# Patient Record
Sex: FEMALE | Race: WHITE | NOT HISPANIC OR LATINO | Employment: UNEMPLOYED | URBAN - METROPOLITAN AREA
[De-identification: names, ages, dates, MRNs, and addresses within clinical notes are randomized per-mention and may not be internally consistent; named-entity substitution may affect disease eponyms.]

---

## 2024-02-01 ENCOUNTER — OFFICE VISIT (OUTPATIENT)
Dept: URGENT CARE | Facility: CLINIC | Age: 49
End: 2024-02-01
Payer: COMMERCIAL

## 2024-02-01 ENCOUNTER — HOSPITAL ENCOUNTER (EMERGENCY)
Facility: HOSPITAL | Age: 49
Discharge: HOME | End: 2024-02-01
Attending: EMERGENCY MEDICINE
Payer: COMMERCIAL

## 2024-02-01 VITALS
TEMPERATURE: 97.9 F | HEART RATE: 74 BPM | HEIGHT: 61 IN | WEIGHT: 167 LBS | OXYGEN SATURATION: 97 % | DIASTOLIC BLOOD PRESSURE: 91 MMHG | BODY MASS INDEX: 31.53 KG/M2 | SYSTOLIC BLOOD PRESSURE: 142 MMHG | RESPIRATION RATE: 18 BRPM

## 2024-02-01 VITALS
OXYGEN SATURATION: 98 % | SYSTOLIC BLOOD PRESSURE: 164 MMHG | HEART RATE: 78 BPM | WEIGHT: 167.4 LBS | TEMPERATURE: 97.9 F | DIASTOLIC BLOOD PRESSURE: 100 MMHG | RESPIRATION RATE: 18 BRPM

## 2024-02-01 DIAGNOSIS — I10 HYPERTENSION, UNSPECIFIED TYPE: Primary | ICD-10-CM

## 2024-02-01 DIAGNOSIS — R51.9 ACUTE NONINTRACTABLE HEADACHE, UNSPECIFIED HEADACHE TYPE: ICD-10-CM

## 2024-02-01 DIAGNOSIS — I10 UNCONTROLLED HYPERTENSION: Primary | ICD-10-CM

## 2024-02-01 PROCEDURE — 99284 EMERGENCY DEPT VISIT MOD MDM: CPT | Performed by: EMERGENCY MEDICINE

## 2024-02-01 PROCEDURE — 99213 OFFICE O/P EST LOW 20 MIN: CPT

## 2024-02-01 PROCEDURE — 99283 EMERGENCY DEPT VISIT LOW MDM: CPT

## 2024-02-01 RX ORDER — AMLODIPINE BESYLATE 5 MG/1
5 TABLET ORAL ONCE
Status: COMPLETED | OUTPATIENT
Start: 2024-02-01 | End: 2024-02-01

## 2024-02-01 RX ORDER — AMLODIPINE BESYLATE 5 MG/1
5 TABLET ORAL DAILY
Qty: 30 TABLET | Refills: 0 | Status: SHIPPED | OUTPATIENT
Start: 2024-02-01

## 2024-02-01 RX ADMIN — AMLODIPINE BESYLATE 5 MG: 5 TABLET ORAL at 20:03

## 2024-02-02 NOTE — PROGRESS NOTES
"  [facility]        NAME: Anna Bishop is a 48 y.o. female  : 1975    MRN: [id number]  DATE: 2024  TIME: 3:38 PM    Assessment and Plan   Hypertension, unspecified type [I10]  1. Hypertension, unspecified type  Transfer to other facility      2. Acute nonintractable headache, unspecified headache type  Transfer to other facility            Patient Instructions     You are to go to the ED for further evaluation of your symptoms.    Follow-up with your PCP after the ED.      Chief Complaint     Chief Complaint   Patient presents with    Headache    Shoulder Pain     Patient reports that for the past 4-5 days has had left sided headaches along with a notice of increased blood pressure.          History of Present Illness       48-year-old female presenting with left sided headache and elevated BP x4-5 days. States she \"feels off.\" Denies chest pain, palpitations, and shortness of breath. No formal HTN diagnosis. Recently purchased home cuff and presents with log of elevated BP's.         Review of Systems   Review of Systems   Constitutional:  Negative for chills and fever.   Respiratory:  Negative for chest tightness and shortness of breath.    Cardiovascular:  Negative for chest pain and palpitations.   Gastrointestinal:  Negative for abdominal pain, diarrhea, nausea and vomiting.   Neurological:  Positive for headaches. Negative for dizziness and light-headedness.         Current Medications       Current Outpatient Medications:     amLODIPine (NORVASC) 5 mg tablet, Take 1 tablet (5 mg total) by mouth daily, Disp: 30 tablet, Rfl: 0  No current facility-administered medications for this visit.    Current Allergies     Allergies as of 2024 - Reviewed 2024   Allergen Reaction Noted    Codeine GI Intolerance 2024            The following portions of the patient's history were reviewed and updated as appropriate: allergies, current medications, past family history, past " medical history, past social history, past surgical history and problem list.     History reviewed. No pertinent past medical history.    Past Surgical History:   Procedure Laterality Date    TUBAL LIGATION      WISDOM TOOTH EXTRACTION         History reviewed. No pertinent family history.      Medications have been verified.        Objective   /100   Pulse 78   Temp 97.9 °F (36.6 °C) (Tympanic)   Resp 18   Wt 75.9 kg (167 lb 6.4 oz)   LMP 01/17/2024 (Approximate)   SpO2 98%        Physical Exam     Physical Exam  Vitals and nursing note reviewed.   Constitutional:       General: She is not in acute distress.     Appearance: She is not ill-appearing or diaphoretic.   HENT:      Head: Normocephalic.      Mouth/Throat:      Mouth: Mucous membranes are moist.   Cardiovascular:      Rate and Rhythm: Normal rate.   Pulmonary:      Effort: Pulmonary effort is normal.   Musculoskeletal:         General: Normal range of motion.      Cervical back: Normal range of motion and neck supple.   Skin:     General: Skin is warm and dry.      Capillary Refill: Capillary refill takes less than 2 seconds.   Neurological:      Mental Status: She is alert and oriented to person, place, and time.

## 2024-02-02 NOTE — ED PROVIDER NOTES
"History  Chief Complaint   Patient presents with    Hypertension     Pt reports having HTN at home for the past couple of days with feeling \"off\" and left sided headache. Pt reports BP has been running in the 140's systolics for the past 3 days. Denies CP/SOB.     Patient with several days of left sided HA and pulsatile sensation in neck for several days. Patient got blood pressure cuff noting elevated BP at home. Gradually worsening over the past week.    Patient with occasional epigastric pain.    Patient recently began exercising. Had tried keto several weeks ago but stopped. Denies hx of HTN.      Hypertension      None       History reviewed. No pertinent past medical history.    Past Surgical History:   Procedure Laterality Date    TUBAL LIGATION      WISDOM TOOTH EXTRACTION         History reviewed. No pertinent family history.  I have reviewed and agree with the history as documented.    E-Cigarette/Vaping    E-Cigarette Use Never User      E-Cigarette/Vaping Substances    Nicotine No     THC No     CBD No     Flavoring No     Other No     Unknown No      Social History     Tobacco Use    Smoking status: Never    Smokeless tobacco: Never   Vaping Use    Vaping status: Never Used   Substance Use Topics    Alcohol use: Yes     Comment: rarely    Drug use: Never       Review of Systems    Physical Exam  Physical Exam  Vitals and nursing note reviewed.   Constitutional:       General: She is not in acute distress.     Appearance: She is well-developed.   HENT:      Head: Normocephalic and atraumatic.      Right Ear: External ear normal.      Left Ear: External ear normal.   Eyes:      General: No scleral icterus.     Conjunctiva/sclera: Conjunctivae normal.      Pupils: Pupils are equal, round, and reactive to light.   Cardiovascular:      Rate and Rhythm: Normal rate and regular rhythm.      Heart sounds: Normal heart sounds.   Pulmonary:      Effort: Pulmonary effort is normal. No respiratory distress.      " Breath sounds: Normal breath sounds.   Abdominal:      General: Bowel sounds are normal.      Palpations: Abdomen is soft.      Tenderness: There is no abdominal tenderness. There is no guarding or rebound.   Musculoskeletal:         General: Normal range of motion.      Cervical back: Normal range of motion.   Skin:     General: Skin is warm and dry.      Findings: No rash.   Neurological:      Mental Status: She is alert and oriented to person, place, and time.         Vital Signs  ED Triage Vitals   Temperature Pulse Respirations Blood Pressure SpO2   02/01/24 1930 02/01/24 1930 02/01/24 1930 02/01/24 1930 02/01/24 1930   97.9 °F (36.6 °C) 75 18 (!) 180/88 98 %      Temp Source Heart Rate Source Patient Position - Orthostatic VS BP Location FiO2 (%)   02/01/24 1930 02/01/24 1930 -- 02/01/24 1930 --   Temporal Monitor  Right arm       Pain Score       02/01/24 1934       6           Vitals:    02/01/24 1930   BP: (!) 180/88   Pulse: 75         Visual Acuity      ED Medications  Medications   amLODIPine (NORVASC) tablet 5 mg (has no administration in time range)       Diagnostic Studies  Results Reviewed       None                   No orders to display              Procedures  Procedures         ED Course                                             Medical Decision Making  Patient with gradual onset of intermittent symptoms consistent with patient's elevated blood pressure.  The patient denies any chest pain radiating pain, shortness of breath or focal neurologic deficit.  The patient has had intermittent gradual headaches that are most likely associated with the patient's elevated blood pressure.  I had a discussion with the patient regarding hypertension and the initiation of an antihypertensive.  Shared medical decision making discussion, the patient was agreeable to starting low-dose Norvasc and following up with her primary care doctor as she already has a scheduled outpatient appointment.  I advised the  patient to take her blood pressure twice a day and keep a journal of this so that her primary care doctor can further manage her hypertension.    The patient (and any family present) verbalized understanding of the discharge instructions and warnings that would necessitate return to the Emergency Department.    All questions were answered prior to discharge.    Amount and/or Complexity of Data Reviewed  External Data Reviewed: notes.     Details: Reviewed patient's outpatient blood work from [facility] through the portal on her phone noting that the patient had recent CBC, BMP with unremarkable results.  Patient also had endocrine workup with metanephrines which appeared to be normal as well.    Risk  Prescription drug management.             Disposition  Final diagnoses:   Uncontrolled hypertension     Time reflects when diagnosis was documented in both MDM as applicable and the Disposition within this note       Time User Action Codes Description Comment    2/1/2024  7:55 PM Calvin Arenas Add [I10] Uncontrolled hypertension           ED Disposition       ED Disposition   Discharge    Condition   Stable    Date/Time   Thu Feb 1, 2024  7:55 PM    Comment   Anna Bishop discharge to home/self care.                   Follow-up Information       Follow up With Specialties Details Why Contact Info Additional Information    Kellie Foss DO  Schedule an appointment as soon as possible for a visit  For further evaluation of your blood pressure [address]  [address]  [facility] [id number] [phone]        [facility] Emergency Department Emergency Medicine Go to  If symptoms worsen [address] [address] [phone] [facility] Emergency Department, [address] [place], [place] [address]            Patient's Medications   Discharge Prescriptions    AMLODIPINE (NORVASC) 5 MG TABLET    Take 1 tablet (5 mg  total) by mouth daily       Start Date: 2/1/2024  End Date: --       Order Dose: 5 mg       Quantity: 30 tablet    Refills: 0       No discharge procedures on file.    PDMP Review       None            ED Provider  Electronically Signed by             Calvin Arenas DO  02/01/24 2001

## 2024-02-02 NOTE — PATIENT INSTRUCTIONS
You are to go to the ED for further evaluation of your symptoms.    Follow-up with your PCP after the ED.